# Patient Record
Sex: FEMALE | Race: WHITE | URBAN - METROPOLITAN AREA
[De-identification: names, ages, dates, MRNs, and addresses within clinical notes are randomized per-mention and may not be internally consistent; named-entity substitution may affect disease eponyms.]

---

## 2017-02-16 ENCOUNTER — OFFICE VISIT (OUTPATIENT)
Dept: FAMILY MEDICINE CLINIC | Age: 37
End: 2017-02-16

## 2017-02-16 VITALS
RESPIRATION RATE: 20 BRPM | WEIGHT: 122.6 LBS | BODY MASS INDEX: 18.64 KG/M2 | TEMPERATURE: 98.3 F | DIASTOLIC BLOOD PRESSURE: 80 MMHG | HEART RATE: 76 BPM | SYSTOLIC BLOOD PRESSURE: 106 MMHG

## 2017-02-16 DIAGNOSIS — H61.23 IMPACTED CERUMEN OF BOTH EARS: ICD-10-CM

## 2017-02-16 DIAGNOSIS — J06.9 UPPER RESPIRATORY TRACT INFECTION, UNSPECIFIED TYPE: ICD-10-CM

## 2017-02-16 DIAGNOSIS — J02.9 SORE THROAT: Primary | ICD-10-CM

## 2017-02-16 LAB — S PYO AG THROAT QL: NORMAL

## 2017-02-16 PROCEDURE — 99213 OFFICE O/P EST LOW 20 MIN: CPT | Performed by: FAMILY MEDICINE

## 2017-02-16 PROCEDURE — 87880 STREP A ASSAY W/OPTIC: CPT | Performed by: FAMILY MEDICINE

## 2017-02-16 PROCEDURE — 69209 REMOVE IMPACTED EAR WAX UNI: CPT | Performed by: FAMILY MEDICINE

## 2017-02-16 RX ORDER — AMOXICILLIN AND CLAVULANATE POTASSIUM 875; 125 MG/1; MG/1
1 TABLET, FILM COATED ORAL EVERY 12 HOURS
Qty: 20 TABLET | Refills: 0 | Status: SHIPPED | OUTPATIENT
Start: 2017-02-16 | End: 2017-02-26

## 2018-02-20 ENCOUNTER — OFFICE VISIT (OUTPATIENT)
Dept: DERMATOLOGY | Age: 38
End: 2018-02-20

## 2018-02-20 DIAGNOSIS — L85.3 XEROSIS OF SKIN: ICD-10-CM

## 2018-02-20 DIAGNOSIS — Z3A.24 24 WEEKS GESTATION OF PREGNANCY: ICD-10-CM

## 2018-02-20 DIAGNOSIS — Z78.9 NON-TOBACCO USER: ICD-10-CM

## 2018-02-20 DIAGNOSIS — L20.9 ATOPIC DERMATITIS, UNSPECIFIED TYPE: Primary | ICD-10-CM

## 2018-02-20 PROCEDURE — 99243 OFF/OP CNSLTJ NEW/EST LOW 30: CPT | Performed by: DERMATOLOGY

## 2018-02-20 RX ORDER — OMEGA-3S/DHA/EPA/FISH OIL/D3 300MG-1000
400 CAPSULE ORAL DAILY
COMMUNITY

## 2018-02-20 NOTE — PROGRESS NOTES
[facility]) Dermatology  [facility] HospitalBrandon triplettle[facility] Boot[facility]  1980    30 y.o. female     Date of Visit: 2018    Chief Complaint:   Chief Complaint   Patient presents with    New Patient     Ecezma flare-up. Started in the last month and a half. Pt. has tried topical creams and steriods. Pt is pregnant 24 weeks        I was asked to see this patient by Dr. Melendez Morning. History of Present Illness:  Jasmine Boothe is a 30 y.o. female who presents with the chief complaint of establish care and for the followin. Patient complains of eczema flare since she has been pregnant, Started about 4-6 weeks ago. States she has a history of eczema as diagnosed prior to pregnancy and was well-controlled. She is tried multiple homeopathic options such as gut probiotic therapy and diet modifications with prior physicians. She remains focused on the \"why\" she is getting eczema now when it was well controlled prior to pregnancy. Inquires about patch testing as she is concerned about nickel in her diet causing a flare of her eczema. She has taken oral prednisone recently and said it helped to decrease itch and flare but that eczema returned. She does not want another course of oral prednisone at this time. She has intermittently applied topical triamcinolone 0.1% ointment to areas but not a consistent basis. She does not moisturize regularly. 24 weeks pregnant      Review of Systems:  Constitutional: Reports general sense of well-being   Skin: No new or changing moles, no history of keloids or hypertrophic scars. Heme: No abnormal bruising or bleeding. Past Medical History, Family History, Surgical History, Medications and Allergies reviewed. Past Skin Hx:   Patient denies past history of melanoma, NMSC, dysplastic nevi, Hx of eczema prior to pregnancy.   PFHx: Denies hx of MM or NMSC    Family History   Problem Relation Age of Onset    Coronary Art Dis Other     Kidney Cancer Maternal Grandmother     Hypertension Mother      Past Medical History:   Diagnosis Date    Acne      Past Surgical History:   Procedure Laterality Date    SHOULDER SURGERY       labrum surgery- R       No Known Allergies  Outpatient Prescriptions Marked as Taking for the 2/20/18 encounter (Office Visit) with Keyla Clark DO   Medication Sig Dispense Refill    Omega-3 Fatty Acids (FISH OIL PO) Take by mouth      vitamin D3 (CHOLECALCIFEROL) 400 units TABS tablet Take 400 Units by mouth daily      triamcinolone (KENALOG) 0.1 % ointment Apply to affected area twice daily for up to 2 weeks or until improved. 160 g 1    Probiotic Product (PROBIOTIC DAILY PO) Take 1 tablet by mouth daily      Prenatal w/o A Vit-Fe Fum-FA (PRENATA PO) Take 1 tablet by mouth daily         Social History:   Social History     Social History    Marital status:      Spouse name: N/A    Number of children: N/A    Years of education: N/A     Occupational History    Not on file. Social History Main Topics    Smoking status: Never Smoker    Smokeless tobacco: Never Used    Alcohol use Yes      Comment: socially    Drug use: Unknown    Sexual activity: Not on file     Other Topics Concern    Not on file     Social History Narrative    No narrative on file       Physical Examination     The following were examined and determined to be normal: Psych/Neuro, Scalp/hair, Conjunctivae/eyelids, Gums/teeth/lips and Neck. Groin/buttocks. Genitalia not examined. The following were examined and determined to be abnormal: Head/face, neck Breast/axilla/chest, Abdomen, Back, RUE, LUE, RLE, LLE and Nails/digits. -General: NAD, well-nourished, well-developed. Areas of skin examined as listed above:   1. erythematous scaly papules coalescing into plaques located to trunk and bilateral upper and lower extremities  2.   Pregnant abdomen, umbilicus uninvolved, no striae distensae, no pustules or vesicles/bullae on exam today  3. Diffuse dry, dull, rough skin with fine bran-like scale that flakes off easily located on face, neck, trunk and bilateral upper and lower extremities    Assessment and Plan     1. Atopic dermatitis, unspecified type    2. 24 weeks gestation of pregnancy    3. Xerosis of skin    4. Non-tobacco user        1. Atopic dermatitis, unspecified type   Patient reluctant to accept the notion that there is likely a genetic predisposition to developing eczema/atopic dermatitis and that pregnancy is known to cause a flare in even well controlled eczema/atopic dermatitis   -I advise against patch testing at this time as this would require systemic steroid use to clear her eczema which patient does not want to do. I do not believe her eczema is related to a contact dermatitis, rather I believe it is an intrinsic genetically predisposed form of eczema which patient has at baseline and it is now flaring due to pregnancy.      -Pt was educated re: chronicity, use of topical steroids for flares, importance of dry skin care regimen, common to see atopic eruption during pregnancy in 1st or 2nd trimester in women who have atopic eczema at baseline.    -We discussed multiple options including systemic prednisone, narrowband UVB treatment- patient declines these treatments at this time.   Gabrielle Franco not FDA approved for pregnancy-do not recommend.     -Plan: Recommend taking lukewarm short showers of less than 10 minutes daily and immediately applying OTC CeraVe thick cream to body after patting dry.   Needs to also apply OTC CeraVe thick cream morning and night as well as p.r.n.  then patient should apply topical triamcinolone 0.1% ointment to all affected areas b.i.d. for at least 2 weeks, then decrease to once daily for 1 week, then apply p.r.n. for flares. -Edu re: sparing use, atrophy, striae, hypopigmentation, telangiectasias.    -Reassured patient that her eczema should return to a more controlled state after

## 2020-09-23 ENCOUNTER — TELEPHONE (OUTPATIENT)
Dept: FAMILY MEDICINE CLINIC | Age: 40
End: 2020-09-23